# Patient Record
Sex: MALE | Race: WHITE | NOT HISPANIC OR LATINO
[De-identification: names, ages, dates, MRNs, and addresses within clinical notes are randomized per-mention and may not be internally consistent; named-entity substitution may affect disease eponyms.]

---

## 2017-03-31 ENCOUNTER — RECORDS - HEALTHEAST (OUTPATIENT)
Dept: ADMINISTRATIVE | Facility: OTHER | Age: 62
End: 2017-03-31

## 2017-05-12 ENCOUNTER — RECORDS - HEALTHEAST (OUTPATIENT)
Dept: ADMINISTRATIVE | Facility: OTHER | Age: 62
End: 2017-05-12

## 2017-08-01 ENCOUNTER — COMMUNICATION - HEALTHEAST (OUTPATIENT)
Dept: FAMILY MEDICINE | Facility: CLINIC | Age: 62
End: 2017-08-01

## 2017-08-01 DIAGNOSIS — I10 ESSENTIAL HYPERTENSION, BENIGN: ICD-10-CM

## 2017-08-07 ENCOUNTER — COMMUNICATION - HEALTHEAST (OUTPATIENT)
Dept: FAMILY MEDICINE | Facility: CLINIC | Age: 62
End: 2017-08-07

## 2017-08-08 ENCOUNTER — COMMUNICATION - HEALTHEAST (OUTPATIENT)
Dept: FAMILY MEDICINE | Facility: CLINIC | Age: 62
End: 2017-08-08

## 2017-11-03 ENCOUNTER — RECORDS - HEALTHEAST (OUTPATIENT)
Dept: ADMINISTRATIVE | Facility: OTHER | Age: 62
End: 2017-11-03

## 2017-12-07 ENCOUNTER — COMMUNICATION - HEALTHEAST (OUTPATIENT)
Dept: FAMILY MEDICINE | Facility: CLINIC | Age: 62
End: 2017-12-07

## 2018-01-08 ENCOUNTER — COMMUNICATION - HEALTHEAST (OUTPATIENT)
Dept: FAMILY MEDICINE | Facility: CLINIC | Age: 63
End: 2018-01-08

## 2018-01-23 ENCOUNTER — COMMUNICATION - HEALTHEAST (OUTPATIENT)
Dept: FAMILY MEDICINE | Facility: CLINIC | Age: 63
End: 2018-01-23

## 2018-01-23 DIAGNOSIS — I10 ESSENTIAL HYPERTENSION, BENIGN: ICD-10-CM

## 2018-01-23 DIAGNOSIS — M10.9 GOUT: ICD-10-CM

## 2018-02-06 ENCOUNTER — OFFICE VISIT - HEALTHEAST (OUTPATIENT)
Dept: FAMILY MEDICINE | Facility: CLINIC | Age: 63
End: 2018-02-06

## 2018-02-06 ENCOUNTER — AMBULATORY - HEALTHEAST (OUTPATIENT)
Dept: FAMILY MEDICINE | Facility: CLINIC | Age: 63
End: 2018-02-06

## 2018-02-06 DIAGNOSIS — Z00.00 ROUTINE GENERAL MEDICAL EXAMINATION AT A HEALTH CARE FACILITY: ICD-10-CM

## 2018-02-06 DIAGNOSIS — R53.83 FATIGUE: ICD-10-CM

## 2018-02-06 DIAGNOSIS — I10 ESSENTIAL HYPERTENSION, BENIGN: ICD-10-CM

## 2018-02-06 DIAGNOSIS — Z12.5 PROSTATE CANCER SCREENING: ICD-10-CM

## 2018-02-06 DIAGNOSIS — R63.5 WEIGHT GAIN: ICD-10-CM

## 2018-02-06 DIAGNOSIS — D64.9 ANEMIA: ICD-10-CM

## 2018-02-06 DIAGNOSIS — M10.9 GOUT: ICD-10-CM

## 2018-02-06 DIAGNOSIS — E78.5 HYPERLIPIDEMIA, UNSPECIFIED HYPERLIPIDEMIA TYPE: ICD-10-CM

## 2018-02-06 DIAGNOSIS — G62.9 NEUROPATHY: ICD-10-CM

## 2018-02-06 LAB
ANION GAP SERPL CALCULATED.3IONS-SCNC: 8 MMOL/L (ref 5–18)
BUN SERPL-MCNC: 13 MG/DL (ref 8–22)
CALCIUM SERPL-MCNC: 9.5 MG/DL (ref 8.5–10.5)
CHLORIDE BLD-SCNC: 103 MMOL/L (ref 98–107)
CHOLEST SERPL-MCNC: 207 MG/DL
CO2 SERPL-SCNC: 28 MMOL/L (ref 22–31)
CREAT SERPL-MCNC: 0.79 MG/DL (ref 0.7–1.3)
ERYTHROCYTE [DISTWIDTH] IN BLOOD BY AUTOMATED COUNT: 13.1 % (ref 11–14.5)
FASTING STATUS PATIENT QL REPORTED: NO
GFR SERPL CREATININE-BSD FRML MDRD: >60 ML/MIN/1.73M2
GLUCOSE BLD-MCNC: 92 MG/DL (ref 70–125)
HCT VFR BLD AUTO: 43.9 % (ref 40–54)
HDLC SERPL-MCNC: 40 MG/DL
HGB BLD-MCNC: 14.6 G/DL (ref 14–18)
LDLC SERPL CALC-MCNC: 110 MG/DL
MCH RBC QN AUTO: 32.1 PG (ref 27–34)
MCHC RBC AUTO-ENTMCNC: 33.3 G/DL (ref 32–36)
MCV RBC AUTO: 97 FL (ref 80–100)
PLATELET # BLD AUTO: 208 THOU/UL (ref 140–440)
PMV BLD AUTO: 7.4 FL (ref 7–10)
POTASSIUM BLD-SCNC: 4.7 MMOL/L (ref 3.5–5)
PSA SERPL-MCNC: 0.4 NG/ML (ref 0–4.5)
RBC # BLD AUTO: 4.55 MILL/UL (ref 4.4–6.2)
SODIUM SERPL-SCNC: 139 MMOL/L (ref 136–145)
TRIGL SERPL-MCNC: 287 MG/DL
TSH SERPL DL<=0.005 MIU/L-ACNC: 1.17 UIU/ML (ref 0.3–5)
URATE SERPL-MCNC: 5.8 MG/DL (ref 3–8)
WBC: 6 THOU/UL (ref 4–11)

## 2018-02-06 RX ORDER — TIMOLOL MALEATE 5 MG/ML
SOLUTION/ DROPS OPHTHALMIC
Refills: 3 | Status: SHIPPED | COMMUNITY
Start: 2017-12-19

## 2018-02-06 RX ORDER — LATANOPROST 50 UG/ML
SOLUTION/ DROPS OPHTHALMIC
Refills: 3 | Status: SHIPPED | COMMUNITY
Start: 2017-12-19

## 2018-02-06 ASSESSMENT — MIFFLIN-ST. JEOR: SCORE: 2341.62

## 2018-02-08 ENCOUNTER — COMMUNICATION - HEALTHEAST (OUTPATIENT)
Dept: FAMILY MEDICINE | Facility: CLINIC | Age: 63
End: 2018-02-08

## 2018-04-06 ENCOUNTER — COMMUNICATION - HEALTHEAST (OUTPATIENT)
Dept: FAMILY MEDICINE | Facility: CLINIC | Age: 63
End: 2018-04-06

## 2018-04-06 DIAGNOSIS — G62.9 NEUROPATHY: ICD-10-CM

## 2018-04-24 ENCOUNTER — COMMUNICATION - HEALTHEAST (OUTPATIENT)
Dept: FAMILY MEDICINE | Facility: CLINIC | Age: 63
End: 2018-04-24

## 2018-04-24 DIAGNOSIS — M10.9 GOUT: ICD-10-CM

## 2018-04-24 DIAGNOSIS — I10 ESSENTIAL HYPERTENSION, BENIGN: ICD-10-CM

## 2018-05-06 ENCOUNTER — COMMUNICATION - HEALTHEAST (OUTPATIENT)
Dept: FAMILY MEDICINE | Facility: CLINIC | Age: 63
End: 2018-05-06

## 2018-05-06 DIAGNOSIS — G62.9 NEUROPATHY: ICD-10-CM

## 2018-05-06 RX ORDER — IBUPROFEN 800 MG/1
TABLET, FILM COATED ORAL
Qty: 90 TABLET | Refills: 0 | Status: SHIPPED | OUTPATIENT
Start: 2018-05-06

## 2018-07-24 ENCOUNTER — COMMUNICATION - HEALTHEAST (OUTPATIENT)
Dept: FAMILY MEDICINE | Facility: CLINIC | Age: 63
End: 2018-07-24

## 2018-07-24 DIAGNOSIS — M10.9 GOUT: ICD-10-CM

## 2018-07-24 DIAGNOSIS — I10 ESSENTIAL HYPERTENSION, BENIGN: ICD-10-CM

## 2018-07-28 ENCOUNTER — COMMUNICATION - HEALTHEAST (OUTPATIENT)
Dept: FAMILY MEDICINE | Facility: CLINIC | Age: 63
End: 2018-07-28

## 2018-09-11 ENCOUNTER — OFFICE VISIT - HEALTHEAST (OUTPATIENT)
Dept: FAMILY MEDICINE | Facility: CLINIC | Age: 63
End: 2018-09-11

## 2018-09-11 DIAGNOSIS — I87.8 VENOUS STASIS: ICD-10-CM

## 2018-09-11 DIAGNOSIS — G60.9 HEREDITARY AND IDIOPATHIC PERIPHERAL NEUROPATHY: ICD-10-CM

## 2018-09-11 DIAGNOSIS — E66.01 MORBID OBESITY (H): ICD-10-CM

## 2018-09-11 LAB
ERYTHROCYTE [SEDIMENTATION RATE] IN BLOOD BY WESTERGREN METHOD: 22 MM/HR (ref 0–15)
RHEUMATOID FACT SERPL-ACNC: <15 IU/ML (ref 0–30)

## 2018-09-11 ASSESSMENT — MIFFLIN-ST. JEOR: SCORE: 2217.79

## 2018-09-13 LAB — ANA SER QL: 0.6 U

## 2018-09-19 ENCOUNTER — COMMUNICATION - HEALTHEAST (OUTPATIENT)
Dept: FAMILY MEDICINE | Facility: CLINIC | Age: 63
End: 2018-09-19

## 2018-10-03 ENCOUNTER — COMMUNICATION - HEALTHEAST (OUTPATIENT)
Dept: FAMILY MEDICINE | Facility: CLINIC | Age: 63
End: 2018-10-03

## 2018-10-03 RX ORDER — IBUPROFEN 800 MG/1
TABLET, FILM COATED ORAL
Qty: 90 TABLET | Refills: 0 | Status: SHIPPED | OUTPATIENT
Start: 2018-10-03

## 2018-10-17 ENCOUNTER — RECORDS - HEALTHEAST (OUTPATIENT)
Dept: VASCULAR ULTRASOUND | Facility: CLINIC | Age: 63
End: 2018-10-17

## 2018-10-17 ENCOUNTER — OFFICE VISIT - HEALTHEAST (OUTPATIENT)
Dept: VASCULAR SURGERY | Facility: CLINIC | Age: 63
End: 2018-10-17

## 2018-10-17 DIAGNOSIS — I83.899 HEMOSIDERIN PIGMENTATION OF LOWER EXTREMITY DUE TO VARICOSE VEINS: ICD-10-CM

## 2018-10-17 DIAGNOSIS — I83.899 VARICOSE VEINS OF UNSPECIFIED LOWER EXTREMITY WITH OTHER COMPLICATIONS: ICD-10-CM

## 2018-10-17 DIAGNOSIS — I87.2 VENOUS INSUFFICIENCY OF BOTH LOWER EXTREMITIES: ICD-10-CM

## 2018-10-17 DIAGNOSIS — I87.2 VENOUS INSUFFICIENCY (CHRONIC) (PERIPHERAL): ICD-10-CM

## 2018-10-17 DIAGNOSIS — L81.8 HEMOSIDERIN PIGMENTATION OF LOWER EXTREMITY DUE TO VARICOSE VEINS: ICD-10-CM

## 2018-10-17 DIAGNOSIS — I83.893 VARICOSE VEINS OF BILATERAL LOWER EXTREMITIES WITH OTHER COMPLICATIONS: ICD-10-CM

## 2018-10-17 DIAGNOSIS — L81.9 DISORDER OF PIGMENTATION, UNSPECIFIED: ICD-10-CM

## 2018-10-17 DIAGNOSIS — I83.893 SYMPTOMATIC VARICOSE VEINS OF BOTH LOWER EXTREMITIES: ICD-10-CM

## 2018-10-18 ENCOUNTER — COMMUNICATION - HEALTHEAST (OUTPATIENT)
Dept: FAMILY MEDICINE | Facility: CLINIC | Age: 63
End: 2018-10-18

## 2018-10-18 DIAGNOSIS — G60.9 HEREDITARY AND IDIOPATHIC PERIPHERAL NEUROPATHY: ICD-10-CM

## 2018-10-27 ENCOUNTER — COMMUNICATION - HEALTHEAST (OUTPATIENT)
Dept: FAMILY MEDICINE | Facility: CLINIC | Age: 63
End: 2018-10-27

## 2018-10-28 RX ORDER — IBUPROFEN 800 MG/1
TABLET, FILM COATED ORAL
Qty: 90 TABLET | Refills: 0 | Status: SHIPPED | OUTPATIENT
Start: 2018-10-28

## 2018-10-29 ENCOUNTER — COMMUNICATION - HEALTHEAST (OUTPATIENT)
Dept: PHARMACY | Facility: CLINIC | Age: 63
End: 2018-10-29

## 2018-11-16 ENCOUNTER — COMMUNICATION - HEALTHEAST (OUTPATIENT)
Dept: FAMILY MEDICINE | Facility: CLINIC | Age: 63
End: 2018-11-16

## 2018-11-16 DIAGNOSIS — G60.9 HEREDITARY AND IDIOPATHIC PERIPHERAL NEUROPATHY: ICD-10-CM

## 2018-11-28 ENCOUNTER — RECORDS - HEALTHEAST (OUTPATIENT)
Dept: ADMINISTRATIVE | Facility: OTHER | Age: 63
End: 2018-11-28

## 2018-11-28 ENCOUNTER — COMMUNICATION - HEALTHEAST (OUTPATIENT)
Dept: FAMILY MEDICINE | Facility: CLINIC | Age: 63
End: 2018-11-28

## 2018-11-29 RX ORDER — IBUPROFEN 800 MG/1
TABLET, FILM COATED ORAL
Qty: 90 TABLET | Refills: 0 | Status: SHIPPED | OUTPATIENT
Start: 2018-11-29

## 2018-12-15 ENCOUNTER — COMMUNICATION - HEALTHEAST (OUTPATIENT)
Dept: FAMILY MEDICINE | Facility: CLINIC | Age: 63
End: 2018-12-15

## 2018-12-15 DIAGNOSIS — G60.9 HEREDITARY AND IDIOPATHIC PERIPHERAL NEUROPATHY: ICD-10-CM

## 2018-12-27 ENCOUNTER — COMMUNICATION - HEALTHEAST (OUTPATIENT)
Dept: FAMILY MEDICINE | Facility: CLINIC | Age: 63
End: 2018-12-27

## 2018-12-27 RX ORDER — IBUPROFEN 800 MG/1
TABLET, FILM COATED ORAL
Qty: 90 TABLET | Refills: 0 | Status: SHIPPED | OUTPATIENT
Start: 2018-12-27

## 2019-01-15 ENCOUNTER — COMMUNICATION - HEALTHEAST (OUTPATIENT)
Dept: FAMILY MEDICINE | Facility: CLINIC | Age: 64
End: 2019-01-15

## 2019-01-15 DIAGNOSIS — G60.9 HEREDITARY AND IDIOPATHIC PERIPHERAL NEUROPATHY: ICD-10-CM

## 2019-01-17 ENCOUNTER — COMMUNICATION - HEALTHEAST (OUTPATIENT)
Dept: FAMILY MEDICINE | Facility: CLINIC | Age: 64
End: 2019-01-17

## 2019-01-17 DIAGNOSIS — I10 ESSENTIAL HYPERTENSION, BENIGN: ICD-10-CM

## 2019-01-25 ENCOUNTER — COMMUNICATION - HEALTHEAST (OUTPATIENT)
Dept: FAMILY MEDICINE | Facility: CLINIC | Age: 64
End: 2019-01-25

## 2019-01-25 RX ORDER — IBUPROFEN 800 MG/1
TABLET, FILM COATED ORAL
Qty: 90 TABLET | Refills: 0 | Status: SHIPPED | OUTPATIENT
Start: 2019-01-25

## 2019-02-28 ENCOUNTER — COMMUNICATION - HEALTHEAST (OUTPATIENT)
Dept: FAMILY MEDICINE | Facility: CLINIC | Age: 64
End: 2019-02-28

## 2019-03-05 ENCOUNTER — COMMUNICATION - HEALTHEAST (OUTPATIENT)
Dept: FAMILY MEDICINE | Facility: CLINIC | Age: 64
End: 2019-03-05

## 2019-03-05 DIAGNOSIS — I10 ESSENTIAL HYPERTENSION, BENIGN: ICD-10-CM

## 2019-03-05 DIAGNOSIS — M10.9 GOUT: ICD-10-CM

## 2019-03-10 RX ORDER — IBUPROFEN 800 MG/1
800 TABLET, FILM COATED ORAL 3 TIMES DAILY PRN
Qty: 270 TABLET | Refills: 0 | Status: SHIPPED | OUTPATIENT
Start: 2019-03-10

## 2019-03-26 ENCOUNTER — COMMUNICATION - HEALTHEAST (OUTPATIENT)
Dept: FAMILY MEDICINE | Facility: CLINIC | Age: 64
End: 2019-03-26

## 2019-03-26 DIAGNOSIS — G60.9 HEREDITARY AND IDIOPATHIC PERIPHERAL NEUROPATHY: ICD-10-CM

## 2019-03-26 RX ORDER — PREGABALIN 100 MG/1
CAPSULE ORAL
Qty: 180 CAPSULE | Refills: 3 | Status: SHIPPED | OUTPATIENT
Start: 2019-03-26

## 2019-06-24 ENCOUNTER — COMMUNICATION - HEALTHEAST (OUTPATIENT)
Dept: FAMILY MEDICINE | Facility: CLINIC | Age: 64
End: 2019-06-24

## 2019-06-24 DIAGNOSIS — I10 ESSENTIAL HYPERTENSION, BENIGN: ICD-10-CM

## 2019-06-24 DIAGNOSIS — M10.9 GOUT: ICD-10-CM

## 2019-06-26 RX ORDER — IBUPROFEN 800 MG/1
TABLET, FILM COATED ORAL
Qty: 270 TABLET | Refills: 0 | Status: SHIPPED | OUTPATIENT
Start: 2019-06-26

## 2019-10-03 ENCOUNTER — COMMUNICATION - HEALTHEAST (OUTPATIENT)
Dept: FAMILY MEDICINE | Facility: CLINIC | Age: 64
End: 2019-10-03

## 2019-10-03 DIAGNOSIS — I10 ESSENTIAL HYPERTENSION, BENIGN: ICD-10-CM

## 2019-10-03 DIAGNOSIS — M10.9 GOUT: ICD-10-CM

## 2019-10-04 RX ORDER — IBUPROFEN 800 MG/1
TABLET, FILM COATED ORAL
Qty: 270 TABLET | Refills: 4 | Status: SHIPPED | OUTPATIENT
Start: 2019-10-04

## 2020-09-29 ENCOUNTER — COMMUNICATION - HEALTHEAST (OUTPATIENT)
Dept: FAMILY MEDICINE | Facility: CLINIC | Age: 65
End: 2020-09-29

## 2020-09-29 DIAGNOSIS — M10.9 GOUT: ICD-10-CM

## 2020-09-29 DIAGNOSIS — I10 ESSENTIAL HYPERTENSION, BENIGN: ICD-10-CM

## 2020-10-04 RX ORDER — METOPROLOL TARTRATE 100 MG
TABLET ORAL
Qty: 90 TABLET | Refills: 3 | Status: SHIPPED | OUTPATIENT
Start: 2020-10-04

## 2020-10-04 RX ORDER — ALLOPURINOL 100 MG/1
TABLET ORAL
Qty: 90 TABLET | Refills: 3 | Status: SHIPPED | OUTPATIENT
Start: 2020-10-04

## 2020-10-04 RX ORDER — IBUPROFEN 800 MG/1
TABLET, FILM COATED ORAL
Qty: 270 TABLET | Refills: 3 | Status: SHIPPED | OUTPATIENT
Start: 2020-10-04

## 2020-10-04 RX ORDER — LISINOPRIL 10 MG/1
TABLET ORAL
Qty: 90 TABLET | Refills: 3 | Status: SHIPPED | OUTPATIENT
Start: 2020-10-04

## 2020-10-05 ENCOUNTER — COMMUNICATION - HEALTHEAST (OUTPATIENT)
Dept: FAMILY MEDICINE | Facility: CLINIC | Age: 65
End: 2020-10-05

## 2021-05-27 NOTE — TELEPHONE ENCOUNTER
Pt. Came into clinic and has recently [facility] and they won't approve his Lyrica 100mg.  He would like this ordered as a 3 month supply rather than a 30 day if you could change it to that.      The form that can be faxed is in Dr. Darling's bin.      Thank you.

## 2021-05-30 NOTE — TELEPHONE ENCOUNTER
RN cannot approve Refill Request    RN can NOT refill this medication med is not covered by policy/route to provider and Protocol failed and NO refill given.       Florence Hunt, [facility] Triage/Med Refill 6/25/2019    Requested Prescriptions   Pending Prescriptions Disp Refills     allopurinol (ZYLOPRIM) 100 MG tablet [Pharmacy Med Name: ALLOPURINOL TABS 100MG] 90 tablet 0     Sig: TAKE 1 TABLET DAILY       Allopurinol/Febuxostat Refill Protocol  Failed - 6/24/2019 11:33 AM        Failed - LFT or AST or ALT in last 12 months     Albumin   Date Value Ref Range Status   08/21/2015 3.8 3.5 - 5.0 g/dL Final     Bilirubin, Total   Date Value Ref Range Status   08/21/2015 0.4 0.0 - 1.0 mg/dL Final     Alkaline Phosphatase   Date Value Ref Range Status   08/21/2015 109 45 - 120 U/L Final     AST   Date Value Ref Range Status   08/21/2015 18 0 - 40 U/L Final     ALT   Date Value Ref Range Status   08/21/2015 22 0 - 45 U/L Final     Protein, Total   Date Value Ref Range Status   08/21/2015 7.2 6.0 - 8.0 g/dL Final                Passed - Visit with PCP or prescribing provider visit in past 12 months     Last office visit with prescriber/PCP: 8/28/2015 Peyman Darling MD OR same dept: 9/11/2018 Aamnda Dean CNP OR same specialty: 9/11/2018 Amanda Dean CNP  Last physical: 2/6/2018 Last MTM visit: Visit date not found   Next visit within 3 mo: Visit date not found  Next physical within 3 mo: Visit date not found  Prescriber OR PCP: Peyman Darling MD  Last diagnosis associated with med order: 1. Gout  - allopurinol (ZYLOPRIM) 100 MG tablet [Pharmacy Med Name: ALLOPURINOL TABS 100MG]; TAKE 1 TABLET DAILY  Dispense: 90 tablet; Refill: 0    2. Benign Essential Hypertension  - lisinopril (PRINIVIL,ZESTRIL) 10 MG tablet [Pharmacy Med Name: LISINOPRIL TABS 10MG]; TAKE 1 TABLET DAILY  Dispense: 90 tablet; Refill: 0    If protocol passes may refill for 12 months if within 3 months of last provider visit (or a total  of 15 months).             lisinopril (PRINIVIL,ZESTRIL) 10 MG tablet [Pharmacy Med Name: LISINOPRIL TABS 10MG] 90 tablet 0     Sig: TAKE 1 TABLET DAILY       Ace Inhibitors Refill Protocol Failed - 6/24/2019 11:33 AM        Failed - Serum Potassium in past 12 months     No results found for: LN-POTASSIUM          Failed - Serum Creatinine in past 12 months     Creatinine   Date Value Ref Range Status   02/06/2018 0.79 0.70 - 1.30 mg/dL Final             Passed - PCP or prescribing provider visit in past 12 months       Last office visit with prescriber/PCP: 8/28/2015 Peyman Darling MD OR same dept: 9/11/2018 Amanda Dean CNP OR same specialty: 9/11/2018 Amanda Dean CNP  Last physical: 2/6/2018 Last MTM visit: Visit date not found   Next visit within 3 mo: Visit date not found  Next physical within 3 mo: Visit date not found  Prescriber OR PCP: Peyman Darling MD  Last diagnosis associated with med order: 1. Gout  - allopurinol (ZYLOPRIM) 100 MG tablet [Pharmacy Med Name: ALLOPURINOL TABS 100MG]; TAKE 1 TABLET DAILY  Dispense: 90 tablet; Refill: 0    2. Benign Essential Hypertension  - lisinopril (PRINIVIL,ZESTRIL) 10 MG tablet [Pharmacy Med Name: LISINOPRIL TABS 10MG]; TAKE 1 TABLET DAILY  Dispense: 90 tablet; Refill: 0    If protocol passes may refill for 12 months if within 3 months of last provider visit (or a total of 15 months).             Passed - Blood pressure filed in past 12 months     BP Readings from Last 1 Encounters:   10/17/18 132/84             ibuprofen (ADVIL,MOTRIN) 800 MG tablet [Pharmacy Med Name: IBUPROFEN TABS 800MG] 270 tablet 0     Sig: TAKE 1 TABLET THREE TIMES A DAY AS NEEDED       There is no refill protocol information for this order

## 2021-05-31 VITALS — WEIGHT: 315 LBS | BODY MASS INDEX: 42.66 KG/M2 | HEIGHT: 72 IN

## 2021-06-01 ENCOUNTER — RECORDS - HEALTHEAST (OUTPATIENT)
Dept: ADMINISTRATIVE | Facility: CLINIC | Age: 66
End: 2021-06-01

## 2021-06-01 VITALS — BODY MASS INDEX: 41.95 KG/M2 | WEIGHT: 309.7 LBS | HEIGHT: 72 IN

## 2021-06-01 VITALS — WEIGHT: 302.1 LBS | BODY MASS INDEX: 40.97 KG/M2

## 2021-06-02 NOTE — TELEPHONE ENCOUNTER
RN cannot approve Refill Request    RN can NOT refill this medication Protocol failed and NO refill given.         Florence Hunt, [facility] Triage/Med Refill 10/4/2019    Requested Prescriptions   Pending Prescriptions Disp Refills     allopurinol (ZYLOPRIM) 100 MG tablet [Pharmacy Med Name: ALLOPURINOL TABS 100MG] 90 tablet 4     Sig: TAKE 1 TABLET DAILY       Allopurinol/Febuxostat Refill Protocol  Failed - 10/3/2019  1:34 PM        Failed - LFT or AST or ALT in last 12 months     Albumin   Date Value Ref Range Status   08/21/2015 3.8 3.5 - 5.0 g/dL Final     Bilirubin, Total   Date Value Ref Range Status   08/21/2015 0.4 0.0 - 1.0 mg/dL Final     Alkaline Phosphatase   Date Value Ref Range Status   08/21/2015 109 45 - 120 U/L Final     AST   Date Value Ref Range Status   08/21/2015 18 0 - 40 U/L Final     ALT   Date Value Ref Range Status   08/21/2015 22 0 - 45 U/L Final     Protein, Total   Date Value Ref Range Status   08/21/2015 7.2 6.0 - 8.0 g/dL Final                Failed - Visit with PCP or prescribing provider visit in past 12 months     Last office visit with prescriber/PCP: 8/28/2015 Peyman Darling MD OR same dept: Visit date not found OR same specialty: 9/11/2018 Amanda Dean CNP  Last physical: 2/6/2018 Last MTM visit: Visit date not found   Next visit within 3 mo: Visit date not found  Next physical within 3 mo: Visit date not found  Prescriber OR PCP: Peyman Darling MD  Last diagnosis associated with med order: 1. Gout  - allopurinol (ZYLOPRIM) 100 MG tablet [Pharmacy Med Name: ALLOPURINOL TABS 100MG]; TAKE 1 TABLET DAILY  Dispense: 90 tablet; Refill: 4    2. Benign Essential Hypertension  - lisinopril (PRINIVIL,ZESTRIL) 10 MG tablet [Pharmacy Med Name: LISINOPRIL TABS 10MG]; TAKE 1 TABLET DAILY  Dispense: 90 tablet; Refill: 4  - metoprolol tartrate (LOPRESSOR) 100 MG tablet [Pharmacy Med Name: METOPROLOL TARTRATE TABS 100MG]; TAKE 1 TABLET DAILY  Dispense: 90 tablet; Refill: 4    If  protocol passes may refill for 12 months if within 3 months of last provider visit (or a total of 15 months).             lisinopril (PRINIVIL,ZESTRIL) 10 MG tablet [Pharmacy Med Name: LISINOPRIL TABS 10MG] 90 tablet 4     Sig: TAKE 1 TABLET DAILY       Ace Inhibitors Refill Protocol Failed - 10/3/2019  1:34 PM        Failed - PCP or prescribing provider visit in past 12 months       Last office visit with prescriber/PCP: 8/28/2015 Peyman Darling MD OR same dept: Visit date not found OR same specialty: 9/11/2018 Amanda Dean CNP  Last physical: 2/6/2018 Last MTM visit: Visit date not found   Next visit within 3 mo: Visit date not found  Next physical within 3 mo: Visit date not found  Prescriber OR PCP: Peyman Darling MD  Last diagnosis associated with med order: 1. Gout  - allopurinol (ZYLOPRIM) 100 MG tablet [Pharmacy Med Name: ALLOPURINOL TABS 100MG]; TAKE 1 TABLET DAILY  Dispense: 90 tablet; Refill: 4    2. Benign Essential Hypertension  - lisinopril (PRINIVIL,ZESTRIL) 10 MG tablet [Pharmacy Med Name: LISINOPRIL TABS 10MG]; TAKE 1 TABLET DAILY  Dispense: 90 tablet; Refill: 4  - metoprolol tartrate (LOPRESSOR) 100 MG tablet [Pharmacy Med Name: METOPROLOL TARTRATE TABS 100MG]; TAKE 1 TABLET DAILY  Dispense: 90 tablet; Refill: 4    If protocol passes may refill for 12 months if within 3 months of last provider visit (or a total of 15 months).             Failed - Serum Potassium in past 12 months     No results found for: LN-POTASSIUM          Failed - Serum Creatinine in past 12 months     Creatinine   Date Value Ref Range Status   02/06/2018 0.79 0.70 - 1.30 mg/dL Final             Passed - Blood pressure filed in past 12 months     BP Readings from Last 1 Encounters:   10/17/18 132/84             ibuprofen (ADVIL,MOTRIN) 800 MG tablet [Pharmacy Med Name: IBUPROFEN TABS 800MG] 270 tablet 4     Sig: TAKE 1 TABLET THREE TIMES A DAY AS NEEDED       There is no refill protocol information for this order         metoprolol tartrate (LOPRESSOR) 100 MG tablet [Pharmacy Med Name: METOPROLOL TARTRATE TABS 100MG] 90 tablet 4     Sig: TAKE 1 TABLET DAILY       Beta-Blockers Refill Protocol Failed - 10/3/2019  1:34 PM        Failed - PCP or prescribing provider visit in past 12 months or next 3 months     Last office visit with prescriber/PCP: 8/28/2015 Peyman Darling MD OR same dept: Visit date not found OR same specialty: 9/11/2018 Amanda Dean CNP  Last physical: 2/6/2018 Last MTM visit: Visit date not found   Next visit within 3 mo: Visit date not found  Next physical within 3 mo: Visit date not found  Prescriber OR PCP: Peyman Darling MD  Last diagnosis associated with med order: 1. Gout  - allopurinol (ZYLOPRIM) 100 MG tablet [Pharmacy Med Name: ALLOPURINOL TABS 100MG]; TAKE 1 TABLET DAILY  Dispense: 90 tablet; Refill: 4    2. Benign Essential Hypertension  - lisinopril (PRINIVIL,ZESTRIL) 10 MG tablet [Pharmacy Med Name: LISINOPRIL TABS 10MG]; TAKE 1 TABLET DAILY  Dispense: 90 tablet; Refill: 4  - metoprolol tartrate (LOPRESSOR) 100 MG tablet [Pharmacy Med Name: METOPROLOL TARTRATE TABS 100MG]; TAKE 1 TABLET DAILY  Dispense: 90 tablet; Refill: 4    If protocol passes may refill for 12 months if within 3 months of last provider visit (or a total of 15 months).             Passed - Blood pressure filed in past 12 months     BP Readings from Last 1 Encounters:   10/17/18 132/84

## 2021-06-12 NOTE — TELEPHONE ENCOUNTER
Please call patient: He is overdue for office visit, has not been seen in nearly 2 years.  In order to continue to receive medication refills he will need to schedule an appointment.

## 2021-06-12 NOTE — TELEPHONE ENCOUNTER
RN cannot approve Refill Request    RN can NOT refill this medication PCP messaged that patient is overdue for Labs. Last office visit: Visit date not found Last Physical: 2/6/2018 Last MTM visit: Visit date not found Last visit same specialty: 9/11/2018 Amanda Dean CNP.  Next visit within 3 mo: Visit date not found  Next physical within 3 mo: Visit date not found      Laurie Wills, [facility] Triage/Med Refill 10/3/2020    Requested Prescriptions   Pending Prescriptions Disp Refills     metoprolol tartrate (LOPRESSOR) 100 MG tablet [Pharmacy Med Name: METOPROLOL TARTRATE TABS 100MG] 90 tablet 3     Sig: TAKE 1 TABLET DAILY       Beta-Blockers Refill Protocol Failed - 9/29/2020  6:13 AM        Failed - PCP or prescribing provider visit in past 12 months or next 3 months     Last office visit with prescriber/PCP: Visit date not found OR same dept: Visit date not found OR same specialty: 9/11/2018 Amanda Dean CNP  Last physical: 2/6/2018 Last MTM visit: Visit date not found   Next visit within 3 mo: Visit date not found  Next physical within 3 mo: Visit date not found  Prescriber OR PCP: Peyman Darling MD  Last diagnosis associated with med order: 1. Benign Essential Hypertension  - metoprolol tartrate (LOPRESSOR) 100 MG tablet [Pharmacy Med Name: METOPROLOL TARTRATE TABS 100MG]; TAKE 1 TABLET DAILY  Dispense: 90 tablet; Refill: 3  - lisinopriL (PRINIVIL,ZESTRIL) 10 MG tablet [Pharmacy Med Name: LISINOPRIL TABS 10MG]; TAKE 1 TABLET DAILY  Dispense: 90 tablet; Refill: 3    2. Gout  - allopurinoL (ZYLOPRIM) 100 MG tablet [Pharmacy Med Name: ALLOPURINOL TABS 100MG]; TAKE 1 TABLET DAILY  Dispense: 90 tablet; Refill: 3    If protocol passes may refill for 12 months if within 3 months of last provider visit (or a total of 15 months).             Failed - Blood pressure filed in past 12 months     BP Readings from Last 1 Encounters:   10/17/18 132/84                ibuprofen (ADVIL,MOTRIN) 800 MG tablet  [Pharmacy Med Name: IBUPROFEN TABS 800MG] 270 tablet 3     Sig: TAKE 1 TABLET THREE TIMES A DAY AS NEEDED       There is no refill protocol information for this order        lisinopriL (PRINIVIL,ZESTRIL) 10 MG tablet [Pharmacy Med Name: LISINOPRIL TABS 10MG] 90 tablet 3     Sig: TAKE 1 TABLET DAILY       Ace Inhibitors Refill Protocol Failed - 9/29/2020  6:13 AM        Failed - PCP or prescribing provider visit in past 12 months       Last office visit with prescriber/PCP: Visit date not found OR same dept: Visit date not found OR same specialty: 9/11/2018 Amanda Dean CNP  Last physical: 2/6/2018 Last MTM visit: Visit date not found   Next visit within 3 mo: Visit date not found  Next physical within 3 mo: Visit date not found  Prescriber OR PCP: Peyman Darling MD  Last diagnosis associated with med order: 1. Benign Essential Hypertension  - metoprolol tartrate (LOPRESSOR) 100 MG tablet [Pharmacy Med Name: METOPROLOL TARTRATE TABS 100MG]; TAKE 1 TABLET DAILY  Dispense: 90 tablet; Refill: 3  - lisinopriL (PRINIVIL,ZESTRIL) 10 MG tablet [Pharmacy Med Name: LISINOPRIL TABS 10MG]; TAKE 1 TABLET DAILY  Dispense: 90 tablet; Refill: 3    2. Gout  - allopurinoL (ZYLOPRIM) 100 MG tablet [Pharmacy Med Name: ALLOPURINOL TABS 100MG]; TAKE 1 TABLET DAILY  Dispense: 90 tablet; Refill: 3    If protocol passes may refill for 12 months if within 3 months of last provider visit (or a total of 15 months).             Failed - Serum Potassium in past 12 months     No results found for: LN-POTASSIUM          Failed - Blood pressure filed in past 12 months     BP Readings from Last 1 Encounters:   10/17/18 132/84             Failed - Serum Creatinine in past 12 months     Creatinine   Date Value Ref Range Status   02/06/2018 0.79 0.70 - 1.30 mg/dL Final                allopurinoL (ZYLOPRIM) 100 MG tablet [Pharmacy Med Name: ALLOPURINOL TABS 100MG] 90 tablet 3     Sig: TAKE 1 TABLET DAILY       Allopurinol/Febuxostat Refill  Protocol  Failed - 9/29/2020  6:13 AM        Failed - LFT or AST or ALT in last 12 months     Albumin   Date Value Ref Range Status   08/21/2015 3.8 3.5 - 5.0 g/dL Final     Bilirubin, Total   Date Value Ref Range Status   08/21/2015 0.4 0.0 - 1.0 mg/dL Final     Alkaline Phosphatase   Date Value Ref Range Status   08/21/2015 109 45 - 120 U/L Final     AST   Date Value Ref Range Status   08/21/2015 18 0 - 40 U/L Final     ALT   Date Value Ref Range Status   08/21/2015 22 0 - 45 U/L Final     Protein, Total   Date Value Ref Range Status   08/21/2015 7.2 6.0 - 8.0 g/dL Final                Failed - Visit with PCP or prescribing provider visit in past 12 months     Last office visit with prescriber/PCP: Visit date not found OR same dept: Visit date not found OR same specialty: 9/11/2018 Amanda Dean CNP  Last physical: 2/6/2018 Last MTM visit: Visit date not found   Next visit within 3 mo: Visit date not found  Next physical within 3 mo: Visit date not found  Prescriber OR PCP: Peyman Darling MD  Last diagnosis associated with med order: 1. Benign Essential Hypertension  - metoprolol tartrate (LOPRESSOR) 100 MG tablet [Pharmacy Med Name: METOPROLOL TARTRATE TABS 100MG]; TAKE 1 TABLET DAILY  Dispense: 90 tablet; Refill: 3  - lisinopriL (PRINIVIL,ZESTRIL) 10 MG tablet [Pharmacy Med Name: LISINOPRIL TABS 10MG]; TAKE 1 TABLET DAILY  Dispense: 90 tablet; Refill: 3    2. Gout  - allopurinoL (ZYLOPRIM) 100 MG tablet [Pharmacy Med Name: ALLOPURINOL TABS 100MG]; TAKE 1 TABLET DAILY  Dispense: 90 tablet; Refill: 3    If protocol passes may refill for 12 months if within 3 months of last provider visit (or a total of 15 months).

## 2021-06-15 NOTE — PROGRESS NOTES
Patient ID: Luis Castelan is a 62 y.o. male.  /84  Pulse (!) 58  Ht 6' (1.829 m)  Wt (!) 337 lb (152.9 kg)  SpO2 98%  BMI 45.71 kg/m2    Assessment/Plan:                   Diagnoses and all orders for this visit:    Routine general medical examination at a health care facility    Hyperlipidemia, unspecified hyperlipidemia type  -     Lipid [place]    Benign Essential Hypertension  -     Basic Metabolic Panel    Gout  -     Uric Acid    Anemia  -     HM2(CBC w/o Differential)    Prostate cancer screening  -     PSA, Annual Screen (Prostatic-Specific Antigen)    Neuropathy  -     pregabalin (LYRICA) 75 MG capsule; Take 1 capsule (75 mg total) by mouth 2 (two) times a day.  Dispense: 60 capsule; Refill: 2  -     ibuprofen (ADVIL,MOTRIN) 800 MG tablet; Take 1 tablet (800 mg total) by mouth every 8 (eight) hours as needed for pain.  Dispense: 90 tablet; Refill: 1    Fatigue  -     Thyroid [place]    Weight gain  -     Thyroid [place]    Other orders  -     Tdap vaccine,  8yo or older,  IM           DISCUSSION  Obtain labs as above.  Initiate Lyrica 75 mg twice daily increased dose.  Tetanus shot today.  Check thyroid given fatigue and weight gain.  Arrange for sleep apnea evaluation at some point in the future.  Subjective:     HPI    Luis Castelan is a 62-year-old man is here today for a physical.  He has a history of cataracts and glaucoma follows closely with eye care provider.  Patient reports that right eye has extremely low visual acuity.  Reports no acute concerns with his vision.  He has a history of hypertension and gout both which are controlled.  He has hyperlipidemia has not been on medication therapy, his last cholesterol numbers had improved to a reasonable range.  He informs me that over this past year he has quit smoking and stopped drinking altogether.  Unfortunately his weight has gone up slightly.  He does snore he is never been evaluated for sleep apnea there is uncertainty as to the  possibility of apnea syndrome but he does report fatigue and daytime somnolence which would be suggestive of the possibility of sleep apnea.  We discussed potentially considering a evaluation but he prefers to hold off until he begins Medicare insurance at age 63.  We discussed this today.  He does have a history of neuropathy affecting both feet which has been worked up in the past.  He is currently using ibuprofen for management of pain symptoms.  He has in the past use both gabapentin and Lyrica but stopped the medications because he felt they were largely ineffective.  In our review of the medications and dosing he never really got above any starting dose especially for the Lyrica so we discussed reinitiating this as a means to control his pain which is becoming more bothersome.  He is due for tetanus shot.  Discussed follow-up on other lab tests.  He denies any gout outbreaks.  Uric acid has been suppressed historically based on review of labs.      Review of Systems  Complete review of systems is obtained.  Other than the specific considerations noted above complete review of systems is negative.    Objective:   Medications:  Current Outpatient Prescriptions   Medication Sig Note     allopurinol (ZYLOPRIM) 100 MG tablet TAKE 1 TABLET BY MOUTH DAILY      atenolol (TENORMIN) 50 MG tablet TAKE 1 TABLET BY MOUTH TWICE DAILY      ibuprofen (ADVIL,MOTRIN) 800 MG tablet Take 1 tablet (800 mg total) by mouth every 8 (eight) hours as needed for pain.      ketorolac (ACULAR) 0.5 % ophthalmic solution  11/18/2016: Received from: External Pharmacy     lisinopril (PRINIVIL,ZESTRIL) 10 MG tablet TAKE 1 TABLET BY MOUTH DAILY      metoprolol tartrate (LOPRESSOR) 100 MG tablet Take 1 tablet (100 mg total) by mouth once daily.      gatifloxacin (ZYMAXID) 0.5 % Drop ophthalmic solution  11/18/2016: Received from: External Pharmacy     latanoprost (XALATAN) 0.005 % ophthalmic solution INSTILL 1 DROP INTO OU QPM UTD 2/6/2018:  Received from: External Pharmacy     prednisoLONE acetate (PRED-FORTE) 1 % ophthalmic suspension  11/18/2016: Received from: External Pharmacy     pregabalin (LYRICA) 75 MG capsule Take 1 capsule (75 mg total) by mouth 2 (two) times a day.      timolol maleate (TIMOPTIC) 0.5 % ophthalmic solution INSTILL 1 DROP INTO OS QAM UTD 2/6/2018: Received from: External Pharmacy     Allergies:  No Known Allergies    Tobacco:   reports that he quit smoking about 3 months ago. His smoking use included Cigarettes. He has never used smokeless tobacco.    HEALTH PREVENTION    General  Dental care: Discussed the importance of regular dental care.  Eye care: Discussed importance of routine eye exams for glaucoma screening      Wt Readings from Last 3 Encounters:   02/06/18 (!) 337 lb (152.9 kg)   11/18/16 (!) 325 lb (147.4 kg)   08/28/15 (!) 320 lb (145.2 kg)     Body mass index is 45.71 kg/(m^2).    The following high BMI interventions were performed this visit: encouragement to exercise and weight monitoring    Cholesterol:   LDL Calculated (mg/dL)   Date Value   08/21/2015 131 (H)   07/25/2013 154 (H)   01/21/2011 130 (H)      Blood Pressure:   BP Readings from Last 3 Encounters:   02/06/18 136/84   11/18/16 134/78   08/28/15 (!) 136/8       Immunization History   Administered Date(s) Administered     Td, adult adsorbed, PF 04/11/1985, 07/30/2007     Td,adult,historic,unspecified 07/30/2007     There are no preventive care reminders to display for this patient.     Physical Exam    /84  Pulse (!) 58  Ht 6' (1.829 m)  Wt (!) 337 lb (152.9 kg)  SpO2 98%  BMI 45.71 kg/m2    General Appearance:    Alert, cooperative, no distress, appears stated age   Head:    Normocephalic, without obvious abnormality, atraumatic   Eyes:   No scleral icterus or conjunctival irritation   Ears:    Normal TM's and external ear canals, both ears   Nose:   Nares normal,    Throat:   Lips, mucosa, and tongue normal; teeth and gums normal   Neck:    Supple, symmetrical, trachea midline, no adenopathy;        thyroid:  No enlargement/tenderness/nodules   Lungs:     Clear to auscultation bilaterally, respirations unlabored   Heart:    Regular rate and rhythm, S1 and S2 normal, no murmur, rub   or gallop   Abdomen:     Soft, non-tender, bowel sounds active,     no masses, no organomegaly   Extremities:   Extremities normal, atraumatic, no cyanosis or edema   Skin:   Skin color, texture, turgor normal, no rashes or lesions   Neurologic:   CNII-XII intact. Normal strength, sensation

## 2021-06-20 NOTE — LETTER
Letter by Peyman Darling MD at      Author: Peyman Darling MD Service: -- Author Type: --    Filed:  Encounter Date: 10/5/2020 Status: (Other)         Luis Castelan  [address] [place] [id number]      October 5, 2020      Dear Luis Castelan,    Per our refill protocol, you are due for a medication check office visit. Your prescription for LOPRESSOR,ADVIL,LISINOPRIL,ZYLOPRIM was sent to your pharmacy (10.4.2020). Please call [phone] to schedule an office visit with  DR. MAYA before your next refill is needed to avoid delays.    Thank you,  [facility]

## 2021-06-20 NOTE — PROGRESS NOTES
Assessment and Plan:     1. Peripheral Neuropathy  pregabalin (LYRICA) 100 MG capsule    Antinuclear Antibody (HA) Cascade    Sedimentation Rate    Rheumatoid Factor Quant   2. Venous stasis  Ambulatory referral to Vascular Center   3. Morbid obesity (H)       Discussed that the patient likely had frostbite which cause the peripheral neuropathy.  Other differentials include venous insufficiency, Raynaud's, autoimmune disease.  Will check HA, sed rate, rheumatoid factor.  Will refer to Vascular Center for further evaluation of venous stasis.  Discussed ways to keep his feet warm which tends to improve his symptoms.  We did increase Lyrica to 100 mg twice daily.  He will follow-up with Dr. Darling in 1 month for medication management or sooner with any further concerns.  He is content with the plan.    Subjective:     Luis is a 63 y.o. male presenting to the clinic for concerns for bilateral foot pain.  Patient states 10 years ago, he locked himself out of the house.  It was a cold night in November and he walked 3 miles to his father's house.  He worse thin layered shoes.  2 days later, he developed a throbbing sensation within his feet and the pain has persisted since.  Patient feels as though his feet are always cold.  The pain wakes him up at night. He uses an electric blanket at night.  Patient is currently taking Lyrica 75 mg twice daily.  He started this in August and has found minimal improvement.  He is interested in increasing the medication.  Patient states he has seen a neurologist in the past.  Patient is concerned because he walked at the [facility] 2 weeks ago.  This past Friday, he noticed that his left foot large toenail was blue in color.  He denies any known injury to the toe.  He had mowed earlier that day.  It is not painful.  He has not noticed any drainage from this.    Review of Systems: A complete 14 point review of systems was obtained and is negative or as stated in the history of present  illness.    Social History     Social History     Marital status:      Spouse name: N/A     Number of children: N/A     Years of education: N/A     Occupational History     Not on file.     Social History Main Topics     Smoking status: Former Smoker     Types: Cigarettes     Quit date: 10/28/2017     Smokeless tobacco: Never Used     Alcohol use Not on file     Drug use: Not on file     Sexual activity: Not on file     Other Topics Concern     Not on file     Social History Narrative       Active Ambulatory Problems     Diagnosis Date Noted     Morbid Obesity      Hyperlipidemia      Acute Gout      Benign Essential Hypertension      Normochromic, Normocytic Anemia      Peripheral Neuropathy      Resolved Ambulatory Problems     Diagnosis Date Noted     No Resolved Ambulatory Problems     No Additional Past Medical History       No family history on file.    Objective:     /84  Pulse (!) 46  Ht 6' (1.829 m)  Wt (!) 309 lb 11.2 oz (140.5 kg)  BMI 42 kg/m2    Patient is alert, in no obvious distress.   Skin: Warm, dry.    Lungs:  Clear to auscultation. Respirations even and unlabored.  No wheezing or rales noted.   Heart:  Regular rate and rhythm.  No murmurs.   Musculoskeletal:  He has hemosiderin present within his bilateral lower extremities consistent with venous stasis.  +1 nonpitting edema is present around both ankles.  He has small varicose veins present around his ankles.  Pedal pulses present and palpable.  He appears to have bruising or a faint hematoma present beneath his left foot, large toenail.

## 2021-06-21 NOTE — PROGRESS NOTES
Bilateral SYM VV with hemosiderin staining. No compression. Plan schedule US .C/O neuropathy in feet.  US done and reviewed no RFA option. RTC PRN

## 2021-06-21 NOTE — PROGRESS NOTES
Bilateral neuropathy from feet up to the ankle, not diabetic.  Neuropathy started about 10 years ago, cause unknown.  Hemosiderin bilaterally on both shins. Pt does not wear compression stockings.  No pain in his legs, only foot pain 8/10 right foot greater than the left.  Pt does not feel his legs swell.  No hx of US.

## 2021-06-23 NOTE — TELEPHONE ENCOUNTER
Controlled Substance Refill Request  Medication:   Requested Prescriptions     Pending Prescriptions Disp Refills     LYRICA 100 mg capsule [Pharmacy Med Name: LYRICA 100MG CAPSULES] 60 capsule 0     Sig: TAKE 1 CAPSULE(100 MG) BY MOUTH TWICE DAILY     Date Last Fill: 12/17/18  Pharmacy: [id number]   Submit electronically to pharmacy  Controlled Substance Agreement on File:   Encounter-Level CSA Scan Date:    There are no encounter-level csa scan date.       Last office visit: Last office visit pertaining to requested medication was 9/11/18.

## 2021-06-23 NOTE — TELEPHONE ENCOUNTER
RN cannot approve Refill Request    RN can NOT refill this medication med is not covered by policy/route to provider. Last office visit: 8/28/2015 Peyman Darling MD Last Physical: 2/6/2018 Last MTM visit: Visit date not found Last visit same specialty: 9/11/2018 Amanda Dean CNP.  Next visit within 3 mo: Visit date not found  Next physical within 3 mo: Visit date not found      Aleta Velazquez, [facility] Triage/Med Refill 1/25/2019    Requested Prescriptions   Pending Prescriptions Disp Refills     ibuprofen (ADVIL,MOTRIN) 800 MG tablet [Pharmacy Med Name: IBUPROFEN 800MG TABLETS] 90 tablet 0     Sig: TAKE 1 TABLET BY MOUTH THREE TIMES DAILY AS NEEDED    There is no refill protocol information for this order

## 2021-06-23 NOTE — TELEPHONE ENCOUNTER
Refill Approved    Rx renewed per Medication Renewal Policy. Medication was last renewed on 7/24/18.    Shanel Christianson, [facility] Triage/Med Refill 1/17/2019     Requested Prescriptions   Pending Prescriptions Disp Refills     metoprolol tartrate (LOPRESSOR) 100 MG tablet [Pharmacy Med Name: METOPROLOL TARTRATE 100MG TABLETS] 90 tablet 0     Sig: TAKE 1 TABLET(100 MG) BY MOUTH EVERY DAY    Beta-Blockers Refill Protocol Passed - 1/17/2019  3:16 AM       Passed - PCP or prescribing provider visit in past 12 months or next 3 months    Last office visit with prescriber/PCP: 8/28/2015 Peyman Darling MD OR same dept: 9/11/2018 Amanda Dean CNP OR same specialty: 9/11/2018 Amanda Dean CNP  Last physical: 2/6/2018 Last MTM visit: Visit date not found   Next visit within 3 mo: Visit date not found  Next physical within 3 mo: Visit date not found  Prescriber OR PCP: Peyman Darling MD  Last diagnosis associated with med order: 1. Benign Essential Hypertension  - metoprolol tartrate (LOPRESSOR) 100 MG tablet [Pharmacy Med Name: METOPROLOL TARTRATE 100MG TABLETS]; TAKE 1 TABLET(100 MG) BY MOUTH EVERY DAY  Dispense: 90 tablet; Refill: 0    If protocol passes may refill for 12 months if within 3 months of last provider visit (or a total of 15 months).            Passed - Blood pressure filed in past 12 months    BP Readings from Last 1 Encounters:   10/17/18 132/84

## 2021-06-24 NOTE — TELEPHONE ENCOUNTER
RN cannot approve Refill Request    RN can NOT refill this medication med is not covered by policy/route to provider. Last office visit: 8/28/2015 Peyman Darling MD Last Physical: 2/6/2018 Last MTM visit: Visit date not found Last visit same specialty: 9/11/2018 Amanda Dean CNP.  Next visit within 3 mo: Visit date not found  Next physical within 3 mo: Visit date not found      Annie Gonsales, [facility] Triage/Med Refill 2/28/2019    Requested Prescriptions   Pending Prescriptions Disp Refills     ibuprofen (ADVIL,MOTRIN) 800 MG tablet [Pharmacy Med Name: IBUPROFEN 800MG TABLETS] 90 tablet 0     Sig: TAKE 1 TABLET BY MOUTH THREE TIMES DAILY AS NEEDED    There is no refill protocol information for this order

## 2021-06-24 NOTE — TELEPHONE ENCOUNTER
RN cannot approve Refill Request    RN can NOT refill this medication Protocol failed and NO refill given. Last office visit: 8/28/2015 Peyman Darling MD Last Physical: 2/6/2018 Last MTM visit: Visit date not found Last visit same specialty: 9/11/2018 Amanda Dean CNP.  Next visit within 3 mo: Visit date not found  Next physical within 3 mo: Visit date not found      Annie Gonsales, [facility] Triage/Med Refill 3/10/2019    Requested Prescriptions   Pending Prescriptions Disp Refills     ibuprofen (ADVIL,MOTRIN) 800 MG tablet 270 tablet 0     Sig: Take 1 tablet (800 mg total) by mouth 3 (three) times a day as needed.    There is no refill protocol information for this order        allopurinol (ZYLOPRIM) 100 MG tablet 90 tablet 0     Sig: Take 1 tablet (100 mg total) by mouth daily.    Allopurinol/Febuxostat Refill Protocol  Failed - 3/5/2019 12:51 PM       Failed - LFT or AST or ALT in last 12 months    Albumin   Date Value Ref Range Status   08/21/2015 3.8 3.5 - 5.0 g/dL Final     Bilirubin, Total   Date Value Ref Range Status   08/21/2015 0.4 0.0 - 1.0 mg/dL Final     Alkaline Phosphatase   Date Value Ref Range Status   08/21/2015 109 45 - 120 U/L Final     AST   Date Value Ref Range Status   08/21/2015 18 0 - 40 U/L Final     ALT   Date Value Ref Range Status   08/21/2015 22 0 - 45 U/L Final     Protein, Total   Date Value Ref Range Status   08/21/2015 7.2 6.0 - 8.0 g/dL Final               Passed - Visit with PCP or prescribing provider visit in past 12 months    Last office visit with prescriber/PCP: 8/28/2015 Peyman Darling MD OR same dept: 9/11/2018 Amanda Dean CNP OR same specialty: 9/11/2018 Amanda Dean CNP  Last physical: 2/6/2018 Last MTM visit: Visit date not found   Next visit within 3 mo: Visit date not found  Next physical within 3 mo: Visit date not found  Prescriber OR PCP: Peyman Darling MD  Last diagnosis associated with med order: 1. Gout  - allopurinol (ZYLOPRIM)  100 MG tablet; Take 1 tablet (100 mg total) by mouth daily.  Dispense: 90 tablet; Refill: 0    2. Benign Essential Hypertension  - lisinopril (PRINIVIL,ZESTRIL) 10 MG tablet; Take 1 tablet (10 mg total) by mouth daily.  Dispense: 90 tablet; Refill: 0  - metoprolol tartrate (LOPRESSOR) 100 MG tablet; TAKE 1 TABLET(100 MG) BY MOUTH EVERY DAY  Dispense: 90 tablet; Refill: 1    If protocol passes may refill for 12 months if within 3 months of last provider visit (or a total of 15 months).             lisinopril (PRINIVIL,ZESTRIL) 10 MG tablet 90 tablet 0     Sig: Take 1 tablet (10 mg total) by mouth daily.    Ace Inhibitors Refill Protocol Failed - 3/5/2019 12:51 PM       Failed - Serum Potassium in past 12 months    No results found for: LN-POTASSIUM         Failed - Serum Creatinine in past 12 months    Creatinine   Date Value Ref Range Status   02/06/2018 0.79 0.70 - 1.30 mg/dL Final            Passed - PCP or prescribing provider visit in past 12 months      Last office visit with prescriber/PCP: 8/28/2015 Peyman Darling MD OR same dept: 9/11/2018 Amanda Dean CNP OR same specialty: 9/11/2018 Amanda Dean CNP  Last physical: 2/6/2018 Last MTM visit: Visit date not found   Next visit within 3 mo: Visit date not found  Next physical within 3 mo: Visit date not found  Prescriber OR PCP: Peyman Darling MD  Last diagnosis associated with med order: 1. Gout  - allopurinol (ZYLOPRIM) 100 MG tablet; Take 1 tablet (100 mg total) by mouth daily.  Dispense: 90 tablet; Refill: 0    2. Benign Essential Hypertension  - lisinopril (PRINIVIL,ZESTRIL) 10 MG tablet; Take 1 tablet (10 mg total) by mouth daily.  Dispense: 90 tablet; Refill: 0  - metoprolol tartrate (LOPRESSOR) 100 MG tablet; TAKE 1 TABLET(100 MG) BY MOUTH EVERY DAY  Dispense: 90 tablet; Refill: 1    If protocol passes may refill for 12 months if within 3 months of last provider visit (or a total of 15 months).            Passed - Blood pressure filed in  past 12 months    BP Readings from Last 1 Encounters:   10/17/18 132/84             metoprolol tartrate (LOPRESSOR) 100 MG tablet 90 tablet 1     Sig: TAKE 1 TABLET(100 MG) BY MOUTH EVERY DAY    Beta-Blockers Refill Protocol Passed - 3/5/2019 12:51 PM       Passed - PCP or prescribing provider visit in past 12 months or next 3 months    Last office visit with prescriber/PCP: 8/28/2015 Peyman Darling MD OR same dept: 9/11/2018 Amanda Dean CNP OR same specialty: 9/11/2018 Amanda Dean CNP  Last physical: 2/6/2018 Last MTM visit: Visit date not found   Next visit within 3 mo: Visit date not found  Next physical within 3 mo: Visit date not found  Prescriber OR PCP: Peyman Darling MD  Last diagnosis associated with med order: 1. Gout  - allopurinol (ZYLOPRIM) 100 MG tablet; Take 1 tablet (100 mg total) by mouth daily.  Dispense: 90 tablet; Refill: 0    2. Benign Essential Hypertension  - lisinopril (PRINIVIL,ZESTRIL) 10 MG tablet; Take 1 tablet (10 mg total) by mouth daily.  Dispense: 90 tablet; Refill: 0  - metoprolol tartrate (LOPRESSOR) 100 MG tablet; TAKE 1 TABLET(100 MG) BY MOUTH EVERY DAY  Dispense: 90 tablet; Refill: 1    If protocol passes may refill for 12 months if within 3 months of last provider visit (or a total of 15 months).            Passed - Blood pressure filed in past 12 months    BP Readings from Last 1 Encounters:   10/17/18 132/84

## 2021-06-26 NOTE — PROGRESS NOTES
Progress Notes by Aleksandr Lee MD at 10/17/2018 10:00 AM     Author: Aleksandr Lee MD Service: -- Author Type: Physician    Filed: 10/24/2018  9:07 AM Encounter Date: 10/17/2018 Status: Signed    : Aleksandr Lee MD (Physician)       [facility] Vein Consult      Assessment:     1. venous stasis changes, bilateral   2. Leg swelling, bilateral     Plan:     1. Treatment options of conservative therapy of stockings use, exercise, weight loss, elevating legs when possible.    2. Script for compression stockings 20-30 mm hg  3. Ultrasound to evaluate legs for incompetency of both deep and superficial system .   4. Surgical treatment   Not closure candidate at this time  5. Follow up: prn.   6. Call for any questions concerns or issues    Subjective:      Luis Castelan is a 63 y.o. male  who was referred by Peyman Darling MD  for evaluation of varicose veins. Symptoms include pain, aching, fatigue, burning, edema and dermatitis. Patient has history of leg selling, pain and vein issues that have progressed. Pain and symptoms have affected daily living and work activities needing medications. Here for evaluation today. no stocking or compression devic use    Allergies:Review of patient's allergies indicates no known allergies.    History reviewed. No pertinent past medical history.    History reviewed. No pertinent surgical history.    Current Outpatient Prescriptions   Medication Sig Note   ? allopurinol (ZYLOPRIM) 100 MG tablet TAKE 1 TABLET BY MOUTH DAILY    ? ibuprofen (ADVIL,MOTRIN) 800 MG tablet TAKE 1 TABLET(800 MG) BY MOUTH EVERY 8 HOURS AS NEEDED FOR PAIN    ? latanoprost (XALATAN) 0.005 % ophthalmic solution INSTILL 1 DROP INTO OU QPM UTD 2/6/2018: Received from: External Pharmacy   ? lisinopril (PRINIVIL,ZESTRIL) 10 MG tablet TAKE 1 TABLET BY MOUTH DAILY    ? metoprolol tartrate (LOPRESSOR) 100 MG tablet TAKE 1 TABLET(100 MG) BY MOUTH EVERY DAY    ? timolol maleate (TIMOPTIC) 0.5 %  ophthalmic solution INSTILL 1 DROP INTO OS QAM UTD 2/6/2018: Received from: External Pharmacy   ? ibuprofen (ADVIL,MOTRIN) 800 MG tablet TAKE 1 TABLET BY MOUTH THREE TIMES DAILY AS NEEDED    ? ketorolac (ACULAR) 0.5 % ophthalmic solution  11/18/2016: Received from: External Pharmacy   ? LYRICA 100 mg capsule TAKE 1 CAPSULE(100 MG) BY MOUTH TWICE DAILY        History reviewed. No pertinent family history.     reports that he quit smoking about a year ago. His smoking use included Cigarettes. He has never used smokeless tobacco.      Review of Systems  Pertinent items are noted in HPI.  A 12 point comprehensive review of systems was negative except as noted.      Objective:     Vitals:    10/17/18 1001   BP: 132/84   Patient Site: Left Arm   Patient Position: Sitting   Pulse: 60   Temp: 98.9  F (37.2  C)   TempSrc: Oral   Weight: (!) 302 lb 1.6 oz (137 kg)     Body mass index is 40.97 kg/(m^2).    EXAM:  GENERAL: This is a well-developed 63 y.o. male who appears his stated age  HEAD: normocephalic  HEENT: Pupils equal and reactive bilaterally  MOUTH: mucus membranes intact. Normal dentation  CARDIAC: RRR without murmur  CHEST/LUNG:  Clear to auscultation bilaterally  ABDOMEN: Soft, nontender, nondistended, no masses noted   NEUROLOGIC: Focally intact, nonfocal, alert and oriented x 3  INTEGUMENT: No open lesions or ulcers  VASCULAR: Pulses intact, symmetrical upper and lower extremities. There areskin changes consistent with chronic venous insufficiency. Varicose veins present in bilateral greater saphenous distribution. Spider veins present bilateral.            Imaging:    US Venous Insufficiency Legs Bilateral (Order [id number])   Imaging   Date: 10/17/2018 Department: [facility]ascular Center Ultrasound [place] Released By: Milo Brandon, CORTEZ, RVT Authorizing: Aleksandr Lee MD   Study Result   [facility] OUTPATIENT  10/17/2018 11:24 AM     EXAM: BILATERAL LOWER EXTREMITY DEEP AND SUPERFICIAL VENOUS  DUPLEX ULTRASOUND WITH PHYSIOLOGIC TESTING     INDICATION: Symptomatic varicose veins. Assess for incompetent veins.     TECHNIQUE: Supine and upright ultrasound of the deep and superficial veins with Valsalva and compression augmentation maneuvers. Duplex imaging is performed utilizing gray-scale, two-dimensional images, color-flow imaging, Doppler waveform analysis, and   spectral Doppler imaging.      INCOMPETENCY CRITERIA: Deep vein reflux reported when greater than 1,000 ms flow reversal.  Superficial vein reflux reported when greater than 500 ms flow reversal.  vein reflux reported as greater than 350 ms flow reversal.     DEEP VEIN FINDINGS:     RIGHT LEG: The common femoral, profunda femoral, femoral, popliteal, and visualized calf veins are patent and compressible.     LEFT LEG:  The common femoral, profunda femoral, femoral, popliteal, and visualized calf veins are patent and compressible.   Incompetent common femoral, profunda femoral, femoral and popliteal veins.     RIGHT SUPERFICIAL VEIN FINDINGS:  GREAT SAPHENOUS VEIN: Competent from the saphenofemoral junction to the knee. Focal incompetence at the mid calf with the vein measures 5 mm in diameter.     SMALL SAPHENOUS VEIN: Competent from the saphenopopliteal junction to the mid calf.     No incompetent perforating veins or abnormal accessory veins identified.     LEFT SUPERFICIAL VEIN FINDINGS:  GREAT SAPHENOUS VEIN: Focal incompetence at the saphenofemoral junction with the vein measures 10 mm. The vein is competent from the proximal thigh to the mid calf.     SMALL SAPHENOUS VEIN: Competent from the saphenopopliteal junction to the proximal calf. Focal incompetence at the mid calf where the vein measures 3 mm in diameter.     No incompetent perforating veins or abnormal accessory veins identified.     IMPRESSION:   CONCLUSION:   1.  No deep venous thrombosis of either lower extremity. Incompetent left common femoral, profunda femoral,  femoral and popliteal veins.  2.  RIGHT LEG: The right superficial venous system is patent, with focal incompetence of the greater saphenous vein at the mid calf.  3.  LEFT LEG: The left superficial venous system is patent, with focal incompetence of the greater saphenous vein at the saphenofemoral junction. Focal incompetence of the small saphenous vein at the mid calf.         Aleksandr Lee MD  [facility] Surgery Dept.

## 2021-07-09 ENCOUNTER — COMMUNICATION - HEALTHEAST (OUTPATIENT)
Dept: FAMILY MEDICINE | Facility: CLINIC | Age: 66
End: 2021-07-09

## 2021-07-09 DIAGNOSIS — I10 ESSENTIAL HYPERTENSION, BENIGN: ICD-10-CM

## 2021-07-09 DIAGNOSIS — M10.9 GOUT: ICD-10-CM

## 2021-07-09 NOTE — TELEPHONE ENCOUNTER
Telephone Encounter by Lauren Silva RN at 7/9/2021  1:52 PM     Author: Lauren Silva RN Service: -- Author Type: Registered Nurse    Filed: 7/9/2021  1:57 PM Encounter Date: 7/9/2021 Status: Signed    : Lauren Silva RN (Registered Nurse)       Medication refill requests declined: should still have refills on file     Disp Refills Start End     ibuprofen (ADVIL,MOTRIN) 800 MG tablet 270 tablet 3 10/4/2020     Sig: TAKE 1 TABLET THREE TIMES A DAY AS NEEDED    Sent to pharmacy as: ibuprofen 800 mg tablet (ADVIL,MOTRIN)    E-Prescribing Status: Receipt confirmed by pharmacy (10/4/2020  6:14 PM CDT)       Disp Refills Start End    allopurinoL (ZYLOPRIM) 100 MG tablet 90 tablet 3 10/4/2020     Sig: TAKE 1 TABLET DAILY    Sent to pharmacy as: allopurinoL 100 mg tablet (ZYLOPRIM)    E-Prescribing Status: Receipt confirmed by pharmacy (10/4/2020  6:14 PM CDT)       Disp Refills Start End    lisinopriL (PRINIVIL,ZESTRIL) 10 MG tablet 90 tablet 3 10/4/2020     Sig: TAKE 1 TABLET DAILY    Sent to pharmacy as: lisinopriL 10 mg tablet (PRINIVIL,ZESTRIL)    E-Prescribing Status: Receipt confirmed by pharmacy (10/4/2020  6:14 PM CDT)      Disp Refills Start End     metoprolol tartrate (LOPRESSOR) 100 MG tablet 90 tablet 3 10/4/2020     Sig: TAKE 1 TABLET DAILY    Sent to pharmacy as: metoprolol tartrate 100 mg tablet (LOPRESSOR)    E-Prescribing Status: Receipt confirmed by pharmacy (10/4/2020  6:14 PM CDT)      Lauren Silva RN BSBA [facility] Triage/Med Refill 7/9/2021 1:55 PM